# Patient Record
Sex: MALE | Race: OTHER | HISPANIC OR LATINO
[De-identification: names, ages, dates, MRNs, and addresses within clinical notes are randomized per-mention and may not be internally consistent; named-entity substitution may affect disease eponyms.]

---

## 2017-11-08 ENCOUNTER — APPOINTMENT (OUTPATIENT)
Dept: RHEUMATOLOGY | Facility: CLINIC | Age: 47
End: 2017-11-08

## 2018-08-09 ENCOUNTER — APPOINTMENT (OUTPATIENT)
Dept: NEUROSURGERY | Facility: CLINIC | Age: 48
End: 2018-08-09
Payer: COMMERCIAL

## 2018-08-09 VITALS
HEART RATE: 75 BPM | DIASTOLIC BLOOD PRESSURE: 85 MMHG | BODY MASS INDEX: 34.25 KG/M2 | HEIGHT: 68 IN | SYSTOLIC BLOOD PRESSURE: 147 MMHG | WEIGHT: 226 LBS

## 2018-08-09 DIAGNOSIS — F12.90 CANNABIS USE, UNSPECIFIED, UNCOMPLICATED: ICD-10-CM

## 2018-08-09 DIAGNOSIS — Z78.9 OTHER SPECIFIED HEALTH STATUS: ICD-10-CM

## 2018-08-09 DIAGNOSIS — M25.561 PAIN IN RIGHT KNEE: ICD-10-CM

## 2018-08-09 PROCEDURE — 99203 OFFICE O/P NEW LOW 30 MIN: CPT

## 2018-08-09 RX ORDER — METHYLPREDNISOLONE 4 MG/1
4 TABLET ORAL EVERY 6 HOURS
Qty: 1 | Refills: 0 | Status: ACTIVE | COMMUNITY
Start: 2018-08-09 | End: 1900-01-01

## 2018-08-10 PROBLEM — Z78.9 SOCIAL ALCOHOL USE: Status: ACTIVE | Noted: 2018-08-10

## 2018-08-10 PROBLEM — F12.90 USE OF CANNABIS: Status: ACTIVE | Noted: 2018-08-10

## 2018-08-10 RX ORDER — NAPROXEN 250 MG/1
250 TABLET ORAL
Refills: 0 | Status: ACTIVE | COMMUNITY

## 2018-08-10 RX ORDER — NAPROXEN SODIUM 550 MG/1
550 TABLET ORAL
Refills: 0 | Status: ACTIVE | COMMUNITY

## 2018-08-10 RX ORDER — GLUCOSAMINE/MSM/CHONDROIT SULF 500-166.6
1500 TABLET ORAL
Refills: 0 | Status: ACTIVE | COMMUNITY

## 2018-08-28 ENCOUNTER — APPOINTMENT (OUTPATIENT)
Dept: MRI IMAGING | Facility: CLINIC | Age: 48
End: 2018-08-28
Payer: COMMERCIAL

## 2018-08-28 ENCOUNTER — OUTPATIENT (OUTPATIENT)
Dept: OUTPATIENT SERVICES | Facility: HOSPITAL | Age: 48
LOS: 1 days | End: 2018-08-28
Payer: COMMERCIAL

## 2018-08-28 DIAGNOSIS — M54.10 RADICULOPATHY, SITE UNSPECIFIED: ICD-10-CM

## 2018-08-28 DIAGNOSIS — M54.2 CERVICALGIA: ICD-10-CM

## 2018-08-28 PROCEDURE — 72148 MRI LUMBAR SPINE W/O DYE: CPT

## 2018-08-28 PROCEDURE — 72148 MRI LUMBAR SPINE W/O DYE: CPT | Mod: 26

## 2018-08-28 PROCEDURE — 72141 MRI NECK SPINE W/O DYE: CPT | Mod: 26

## 2018-08-28 PROCEDURE — 72141 MRI NECK SPINE W/O DYE: CPT

## 2018-09-24 ENCOUNTER — APPOINTMENT (OUTPATIENT)
Dept: NEUROSURGERY | Facility: CLINIC | Age: 48
End: 2018-09-24
Payer: COMMERCIAL

## 2018-09-24 VITALS
HEIGHT: 68 IN | HEART RATE: 72 BPM | SYSTOLIC BLOOD PRESSURE: 138 MMHG | DIASTOLIC BLOOD PRESSURE: 90 MMHG | BODY MASS INDEX: 34.25 KG/M2 | WEIGHT: 226 LBS

## 2018-09-24 DIAGNOSIS — M54.10 RADICULOPATHY, SITE UNSPECIFIED: ICD-10-CM

## 2018-09-24 DIAGNOSIS — M54.2 CERVICALGIA: ICD-10-CM

## 2018-09-24 DIAGNOSIS — G89.29 CERVICALGIA: ICD-10-CM

## 2018-09-24 PROCEDURE — 99213 OFFICE O/P EST LOW 20 MIN: CPT

## 2018-09-24 RX ORDER — CYCLOBENZAPRINE HYDROCHLORIDE 10 MG/1
10 TABLET, FILM COATED ORAL 3 TIMES DAILY
Qty: 90 | Refills: 0 | Status: ACTIVE | COMMUNITY
Start: 2018-09-24 | End: 1900-01-01

## 2018-09-25 PROBLEM — M54.2 NECK PAIN, CHRONIC: Status: ACTIVE | Noted: 2018-08-09

## 2018-09-25 PROBLEM — M54.10 BACK PAIN WITH RIGHT-SIDED RADICULOPATHY: Status: ACTIVE | Noted: 2018-08-09

## 2023-01-30 ENCOUNTER — OFFICE (OUTPATIENT)
Dept: URBAN - METROPOLITAN AREA CLINIC 115 | Facility: CLINIC | Age: 53
Setting detail: OPHTHALMOLOGY
End: 2023-01-30
Payer: COMMERCIAL

## 2023-01-30 DIAGNOSIS — H25.13: ICD-10-CM

## 2023-01-30 DIAGNOSIS — E11.9: ICD-10-CM

## 2023-01-30 DIAGNOSIS — H01.014: ICD-10-CM

## 2023-01-30 DIAGNOSIS — H01.011: ICD-10-CM

## 2023-01-30 DIAGNOSIS — H52.4: ICD-10-CM

## 2023-01-30 PROCEDURE — 92015 DETERMINE REFRACTIVE STATE: CPT | Performed by: OPHTHALMOLOGY

## 2023-01-30 PROCEDURE — 92014 COMPRE OPH EXAM EST PT 1/>: CPT | Performed by: OPHTHALMOLOGY

## 2023-01-30 ASSESSMENT — REFRACTION_MANIFEST
OU_VA: 20/20
OS_SPHERE: +1.75
OD_ADD: +2.00
OS_SPHERE: +1.50
OD_AXIS: 030
OS_CYLINDER: -0.50
OD_SPHERE: +1.50
OS_CYLINDER: -0.50
OS_ADD: +2.00
OS_AXIS: 140
OS_ADD: +1.50
OD_AXIS: 15
OD_ADD: +1.50
OS_AXIS: 165
OD_VA1: 20/25+2
OD_VA1: 20/20
OS_VA1: 20/20-2
OD_SPHERE: +1.50
OS_VA1: 20/20
OD_CYLINDER: -0.50
OD_CYLINDER: -0.50

## 2023-01-30 ASSESSMENT — KERATOMETRY
OD_AXISANGLE_DEGREES: 087
METHOD_AUTO_MANUAL: AUTO
OD_K2POWER_DIOPTERS: 41.25
OD_K1POWER_DIOPTERS: 40.25
OS_AXISANGLE_DEGREES: 075
OS_K2POWER_DIOPTERS: 41.50
OS_K1POWER_DIOPTERS: 40.50

## 2023-01-30 ASSESSMENT — TONOMETRY: OS_IOP_MMHG: 21

## 2023-01-30 ASSESSMENT — AXIALLENGTH_DERIVED
OS_AL: 23.9306
OD_AL: 24.0254
OD_AL: 24.1269
OS_AL: 23.8308
OD_AL: 24.1269
OS_AL: 24.0312

## 2023-01-30 ASSESSMENT — REFRACTION_AUTOREFRACTION
OS_SPHERE: +2.00
OD_CYLINDER: -0.50
OD_AXIS: 033
OS_AXIS: 144
OS_CYLINDER: -0.50
OD_SPHERE: +1.75

## 2023-01-30 ASSESSMENT — VISUAL ACUITY
OD_BCVA: 20/70-2
OS_BCVA: 20/40

## 2023-01-30 ASSESSMENT — SPHEQUIV_DERIVED
OS_SPHEQUIV: 1.75
OS_SPHEQUIV: 1.5
OD_SPHEQUIV: 1.25
OS_SPHEQUIV: 1.25
OD_SPHEQUIV: 1.25
OD_SPHEQUIV: 1.5

## 2023-01-30 ASSESSMENT — LID EXAM ASSESSMENTS
OS_BLEPHARITIS: LUL 1+
OD_BLEPHARITIS: RUL 1+

## 2023-01-30 ASSESSMENT — CONFRONTATIONAL VISUAL FIELD TEST (CVF)
OD_FINDINGS: FULL
OS_FINDINGS: FULL

## 2023-08-11 ENCOUNTER — EMERGENCY (EMERGENCY)
Facility: HOSPITAL | Age: 53
LOS: 1 days | Discharge: HOME | End: 2023-08-11
Attending: STUDENT IN AN ORGANIZED HEALTH CARE EDUCATION/TRAINING PROGRAM
Payer: COMMERCIAL

## 2023-08-11 VITALS
HEIGHT: 70 IN | TEMPERATURE: 98 F | RESPIRATION RATE: 18 BRPM | WEIGHT: 220.02 LBS | OXYGEN SATURATION: 95 % | HEART RATE: 93 BPM | SYSTOLIC BLOOD PRESSURE: 161 MMHG | DIASTOLIC BLOOD PRESSURE: 96 MMHG

## 2023-08-11 PROCEDURE — 71046 X-RAY EXAM CHEST 2 VIEWS: CPT

## 2023-08-11 PROCEDURE — 99283 EMERGENCY DEPT VISIT LOW MDM: CPT | Mod: 25

## 2023-08-11 PROCEDURE — 99284 EMERGENCY DEPT VISIT MOD MDM: CPT

## 2023-08-11 PROCEDURE — 71046 X-RAY EXAM CHEST 2 VIEWS: CPT | Mod: 26

## 2023-08-11 PROCEDURE — T1013: CPT

## 2023-08-11 RX ORDER — IBUPROFEN 200 MG
600 TABLET ORAL ONCE
Refills: 0 | Status: COMPLETED | OUTPATIENT
Start: 2023-08-11 | End: 2023-08-11

## 2023-08-11 RX ORDER — CYCLOBENZAPRINE HYDROCHLORIDE 10 MG/1
10 TABLET, FILM COATED ORAL ONCE
Refills: 0 | Status: COMPLETED | OUTPATIENT
Start: 2023-08-11 | End: 2023-08-11

## 2023-08-11 RX ADMIN — Medication 600 MILLIGRAM(S): at 21:56

## 2023-08-11 RX ADMIN — CYCLOBENZAPRINE HYDROCHLORIDE 10 MILLIGRAM(S): 10 TABLET, FILM COATED ORAL at 21:56
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
## 2023-08-11 NOTE — ED PROVIDER NOTE - PATIENT PORTAL LINK FT
You can access the FollowMyHealth Patient Portal offered by [facility] by registering at the following website: [email or web address]. By joining [employer]’s FollowMyHealth portal, you will also be able to view your health information using other applications (apps) compatible with our system.

## 2023-08-11 NOTE — ED PROVIDER NOTE - NS ED ROS FT
No fever/chills   No photophobia/eye pain/changes in visio,   No ear pain/sore throat/dysphagia   No chest pain/palpitations  No SOB/cough/wheeze/stridor   No abdominal pain, No N/V/D  No dysuria/frequency/discharge   + neck pain no back pain,   No rash  No changes in neurological status/function.

## 2023-08-11 NOTE — ED PROVIDER NOTE - PHYSICAL EXAMINATION
Constitutional - well-developed.   Head - NCAT. Airway patent.   Eyes - PERRL.   CV - RRR  pulm - CTAB  abd - soft, nt. no rebound. no guarding.  Neuro - A&Ox3. strength 5/5 x4. sensation intact x4. normal gait.   Skin - No rash. .  MSK - normal ROM. R paraspinal neck ttp. no midline ttp

## 2023-09-04 ENCOUNTER — EMERGENCY (EMERGENCY)
Facility: HOSPITAL | Age: 53
LOS: 1 days | Discharge: HOME | End: 2023-09-04
Attending: EMERGENCY MEDICINE
Payer: COMMERCIAL

## 2023-09-04 VITALS
TEMPERATURE: 98 F | OXYGEN SATURATION: 95 % | RESPIRATION RATE: 16 BRPM | HEART RATE: 103 BPM | HEIGHT: 70 IN | DIASTOLIC BLOOD PRESSURE: 94 MMHG | SYSTOLIC BLOOD PRESSURE: 174 MMHG | WEIGHT: 179.9 LBS

## 2023-09-04 PROBLEM — Z78.9 OTHER SPECIFIED HEALTH STATUS: Chronic | Status: ACTIVE | Noted: 2023-08-11

## 2023-09-04 PROCEDURE — 12002 RPR S/N/AX/GEN/TRNK2.6-7.5CM: CPT

## 2023-09-04 PROCEDURE — 90471 IMMUNIZATION ADMIN: CPT

## 2023-09-04 PROCEDURE — 90715 TDAP VACCINE 7 YRS/> IM: CPT

## 2023-09-04 PROCEDURE — 99284 EMERGENCY DEPT VISIT MOD MDM: CPT | Mod: 25

## 2023-09-04 PROCEDURE — 99283 EMERGENCY DEPT VISIT LOW MDM: CPT | Mod: 23

## 2023-09-04 RX ORDER — TETANUS TOXOID, REDUCED DIPHTHERIA TOXOID AND ACELLULAR PERTUSSIS VACCINE, ADSORBED 5; 2.5; 8; 8; 2.5 [IU]/.5ML; [IU]/.5ML; UG/.5ML; UG/.5ML; UG/.5ML
0.5 SUSPENSION INTRAMUSCULAR ONCE
Refills: 0 | Status: COMPLETED | OUTPATIENT
Start: 2023-09-04 | End: 2023-09-04

## 2023-09-04 RX ORDER — CEPHALEXIN 500 MG
1 CAPSULE ORAL
Qty: 40 | Refills: 0
Start: 2023-09-04 | End: 2023-09-13

## 2023-09-04 RX ADMIN — TETANUS TOXOID, REDUCED DIPHTHERIA TOXOID AND ACELLULAR PERTUSSIS VACCINE, ADSORBED 0.5 MILLILITER(S): 5; 2.5; 8; 8; 2.5 SUSPENSION INTRAMUSCULAR at 12:11

## 2023-09-04 NOTE — ED PROVIDER NOTE - MDM ORDERS SUBMITTED SELECTION
He cannot remain on opiates long term and cannot have 2 different opiate prescriptions at the same time, only one or the other.   Not Applicable
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
## 2023-09-04 NOTE — ED PROVIDER NOTE - OBJECTIVE STATEMENT
52 y/o M c/o laceration on right anterior shin x 2 hours.   Not up to date on tetanus.  Patient accidentally cut it on a piece of aluminum when he was stepping in the garbage.

## 2023-09-04 NOTE — ED PROVIDER NOTE - NS ED ATTENDING STATEMENT MOD
This was a shared visit with the SELAM. I reviewed and verified the documentation and independently performed the documented:

## 2023-09-04 NOTE — ED PROVIDER NOTE - PATIENT PORTAL LINK FT
You can access the FollowMyHealth Patient Portal offered by [facility] by registering at the following website: [email or web address]. By joining [employer]’s FollowMyHealth portal, you will also be able to view your health information using other applications (apps) compatible with our system.

## 2023-12-27 PROBLEM — Z00.00 ENCOUNTER FOR PREVENTIVE HEALTH EXAMINATION: Status: ACTIVE | Noted: 2023-12-27

## 2023-12-28 ENCOUNTER — APPOINTMENT (OUTPATIENT)
Dept: UROLOGY | Facility: CLINIC | Age: 53
End: 2023-12-28
Payer: COMMERCIAL

## 2023-12-28 VITALS
BODY MASS INDEX: 31.5 KG/M2 | SYSTOLIC BLOOD PRESSURE: 125 MMHG | HEART RATE: 96 BPM | HEIGHT: 70 IN | WEIGHT: 220 LBS | DIASTOLIC BLOOD PRESSURE: 90 MMHG

## 2023-12-28 DIAGNOSIS — Z78.9 OTHER SPECIFIED HEALTH STATUS: ICD-10-CM

## 2023-12-28 DIAGNOSIS — Z83.3 FAMILY HISTORY OF DIABETES MELLITUS: ICD-10-CM

## 2023-12-28 DIAGNOSIS — N28.89 OTHER SPECIFIED DISORDERS OF KIDNEY AND URETER: ICD-10-CM

## 2023-12-28 DIAGNOSIS — F17.200 NICOTINE DEPENDENCE, UNSPECIFIED, UNCOMPLICATED: ICD-10-CM

## 2023-12-28 PROCEDURE — 99203 OFFICE O/P NEW LOW 30 MIN: CPT

## 2023-12-28 RX ORDER — TADALAFIL 5 MG/1
5 TABLET ORAL
Qty: 30 | Refills: 0 | Status: ACTIVE | COMMUNITY
Start: 2023-12-28

## 2023-12-28 RX ORDER — ROSUVASTATIN CALCIUM 20 MG/1
20 TABLET, FILM COATED ORAL DAILY
Qty: 30 | Refills: 0 | Status: ACTIVE | COMMUNITY
Start: 2023-12-28

## 2023-12-28 RX ORDER — GLIMEPIRIDE 4 MG/1
4 TABLET ORAL DAILY
Qty: 30 | Refills: 0 | Status: ACTIVE | COMMUNITY
Start: 2023-12-28

## 2023-12-28 NOTE — LETTER BODY
[Dear  ___] : Dear  [unfilled], [Courtesy Letter:] : I had the pleasure of seeing your patient, [unfilled], in my office today. [Please see my note below.] : Please see my note below. [Sincerely,] : Sincerely, [FreeTextEntry3] : Ed  Cisco Boyer MD [facility]  of [facility] [place] and [employer] at [facility]
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
## 2023-12-28 NOTE — ASSESSMENT
[FreeTextEntry1] : Impression:  images and report of MRI reviewed.   Right upper pole solid renal mass.   Plan:  refer to Dr. Xiong for consideration of partial nephrectomy.

## 2023-12-28 NOTE — PHYSICAL EXAM
[Normal Appearance] : normal appearance [Well Groomed] : well groomed [General Appearance - In No Acute Distress] : no acute distress [Edema] : no peripheral edema [Respiration, Rhythm And Depth] : normal respiratory rhythm and effort [Exaggerated Use Of Accessory Muscles For Inspiration] : no accessory muscle use [Abdomen Tenderness] : non-tender [Normal Station and Gait] : the gait and station were normal for the patient's age [] : no rash [No Focal Deficits] : no focal deficits [Oriented To Time, Place, And Person] : oriented to person, place, and time [Affect] : the affect was normal [Mood] : the mood was normal [No Palpable Adenopathy] : no palpable adenopathy

## 2024-02-12 ENCOUNTER — APPOINTMENT (OUTPATIENT)
Dept: UROLOGY | Facility: CLINIC | Age: 54
End: 2024-02-12

## 2024-04-08 ENCOUNTER — APPOINTMENT (OUTPATIENT)
Dept: UROLOGY | Facility: CLINIC | Age: 54
End: 2024-04-08

## 2024-08-17 ENCOUNTER — RX ONLY (RX ONLY)
Age: 54
End: 2024-08-17

## 2024-08-17 ENCOUNTER — OFFICE (OUTPATIENT)
Dept: URBAN - METROPOLITAN AREA CLINIC 12 | Facility: CLINIC | Age: 54
Setting detail: OPHTHALMOLOGY
End: 2024-08-17
Payer: COMMERCIAL

## 2024-08-17 DIAGNOSIS — H01.011: ICD-10-CM

## 2024-08-17 DIAGNOSIS — H16.223: ICD-10-CM

## 2024-08-17 DIAGNOSIS — H25.13: ICD-10-CM

## 2024-08-17 DIAGNOSIS — H01.014: ICD-10-CM

## 2024-08-17 DIAGNOSIS — H52.4: ICD-10-CM

## 2024-08-17 PROCEDURE — 92014 COMPRE OPH EXAM EST PT 1/>: CPT | Performed by: OPTOMETRIST

## 2024-08-17 PROCEDURE — 92015 DETERMINE REFRACTIVE STATE: CPT | Performed by: OPTOMETRIST

## 2024-08-17 PROCEDURE — 92250 FUNDUS PHOTOGRAPHY W/I&R: CPT | Performed by: OPTOMETRIST

## 2024-08-17 ASSESSMENT — CONFRONTATIONAL VISUAL FIELD TEST (CVF)
OD_FINDINGS: FULL
OS_FINDINGS: FULL

## 2024-08-17 ASSESSMENT — LID EXAM ASSESSMENTS
OD_BLEPHARITIS: RUL 1+
OS_BLEPHARITIS: LUL 1+

## 2024-08-27 ENCOUNTER — APPOINTMENT (OUTPATIENT)
Dept: GASTROENTEROLOGY | Facility: CLINIC | Age: 54
End: 2024-08-27
Payer: COMMERCIAL

## 2024-08-27 VITALS
RESPIRATION RATE: 16 BRPM | WEIGHT: 216 LBS | HEIGHT: 70 IN | HEART RATE: 90 BPM | BODY MASS INDEX: 30.92 KG/M2 | OXYGEN SATURATION: 98 % | DIASTOLIC BLOOD PRESSURE: 80 MMHG | SYSTOLIC BLOOD PRESSURE: 125 MMHG

## 2024-08-27 DIAGNOSIS — Z12.11 ENCOUNTER FOR SCREENING FOR MALIGNANT NEOPLASM OF COLON: ICD-10-CM

## 2024-08-27 DIAGNOSIS — Z86.39 PERSONAL HISTORY OF OTHER ENDOCRINE, NUTRITIONAL AND METABOLIC DISEASE: ICD-10-CM

## 2024-08-27 DIAGNOSIS — N28.89 OTHER SPECIFIED DISORDERS OF KIDNEY AND URETER: ICD-10-CM

## 2024-08-27 DIAGNOSIS — R11.0 NAUSEA: ICD-10-CM

## 2024-08-27 DIAGNOSIS — M54.2 CERVICALGIA: ICD-10-CM

## 2024-08-27 DIAGNOSIS — M25.561 PAIN IN RIGHT KNEE: ICD-10-CM

## 2024-08-27 DIAGNOSIS — M54.10 RADICULOPATHY, SITE UNSPECIFIED: ICD-10-CM

## 2024-08-27 DIAGNOSIS — R14.2 ERUCTATION: ICD-10-CM

## 2024-08-27 DIAGNOSIS — K21.9 GASTRO-ESOPHAGEAL REFLUX DISEASE W/OUT ESOPHAGITIS: ICD-10-CM

## 2024-08-27 DIAGNOSIS — G89.29 CERVICALGIA: ICD-10-CM

## 2024-08-27 PROCEDURE — 99204 OFFICE O/P NEW MOD 45 MIN: CPT

## 2024-08-27 RX ORDER — ESOMEPRAZOLE MAGNESIUM 20 MG/1
20 CAPSULE, DELAYED RELEASE ORAL
Refills: 0 | Status: ACTIVE | COMMUNITY

## 2024-08-27 RX ORDER — GABAPENTIN 300 MG/1
300 CAPSULE ORAL
Refills: 0 | Status: ACTIVE | COMMUNITY

## 2024-08-27 RX ORDER — POLYETHYLENE GLYCOL-3350, SODIUM CHLORIDE, POTASSIUM CHLORIDE AND SODIUM BICARBONATE 420; 11.2; 5.72; 1.48 G/438.4G; G/438.4G; G/438.4G; G/438.4G
420 POWDER, FOR SOLUTION ORAL
Qty: 1 | Refills: 0 | Status: ACTIVE | COMMUNITY
Start: 2024-08-27 | End: 1900-01-01

## 2024-08-27 NOTE — HISTORY OF PRESENT ILLNESS
[FreeTextEntry1] : The patient's son was present with him and acted as .  The patient has a history of chronic dyspeptic symptoms for many years for which she apparently underwent an upper endoscopy many years ago the results of which she does not recall.  He was apparently treated with an antiacid and the symptom significantly diminished only to recur intermittently.  At this time he notes rather frequent bouts of heartburn associated with belching and nausea.  He has been taking over-the-counter Nexium 20 mg as needed.  He denies any dysphagia.  His appetite and weight are stable.  He has never undergone a colonoscopy.  He denies any diarrhea or constipation.  There is no rectal bleeding.  In December of last year he was seen by a urologist, Dr. Boyer, for evaluation of a 2.4 cm solid right upper pole renal mass.  He was referred to Dr. Murrieta for possible resection but the patient decided not to follow through as he was afraid and has not had any further evaluation since that time.

## 2024-08-27 NOTE — ASSESSMENT
[FreeTextEntry1] : The patient essentially has chronic dyspeptic symptoms with a significant component of what sounds like gastroesophageal reflux.  He will therefore be scheduled for an upper endoscopy for further evaluation.  In the meantime I have recommended that he take the over-the-counter Nexium every morning.  He should avoid chocolate, peppermint and fatty foods.  Weight loss was also recommended.  He should not eat or drink within 3 hours of bedtime.  He has never undergone a colonoscopy and a screening colonoscopy will be scheduled as well.  He will obtain a CBC, basic metabolic profile, prothrombin time and celiac antibodies.  I have also cautioned him about the possibility of an underlying small kidney cancer and strongly advised him to follow-up with the urologist. The risks, benefits, complications and possible adverse consequences associated with upper endoscopy were discussed with the patient. The risks, benefits, complications and possible adverse consequences associated with colonoscopy were discussed with the patient.

## 2024-08-27 NOTE — PHYSICAL EXAM
[Alert] : alert [Normal Voice/Communication] : normal voice/communication [Healthy Appearing] : healthy appearing [No Acute Distress] : no acute distress [Sclera] : the sclera and conjunctiva were normal [Hearing Threshold Finger Rub Not Porter] : hearing was normal [Normal Lips/Gums] : the lips and gums were normal [Oropharynx] : the oropharynx was normal [Normal Appearance] : the appearance of the neck was normal [No Respiratory Distress] : no respiratory distress [No Acc Muscle Use] : no accessory muscle use [Respiration, Rhythm And Depth] : normal respiratory rhythm and effort [Auscultation Breath Sounds / Voice Sounds] : lungs were clear to auscultation bilaterally [Heart Rate And Rhythm] : heart rate was normal and rhythm regular [Normal S1, S2] : normal S1 and S2 [Murmurs] : no murmurs [Bowel Sounds] : normal bowel sounds [Abdomen Tenderness] : non-tender [No Masses] : no abdominal mass palpated [Abdomen Soft] : soft [] : no hepatosplenomegaly [Oriented To Time, Place, And Person] : oriented to person, place, and time [Obese (BMI >= 30)] : obese (BMI >= 30)

## 2024-10-14 ENCOUNTER — APPOINTMENT (OUTPATIENT)
Dept: UROLOGY | Facility: CLINIC | Age: 54
End: 2024-10-14

## 2024-10-22 ENCOUNTER — APPOINTMENT (OUTPATIENT)
Dept: GASTROENTEROLOGY | Facility: GI CENTER | Age: 54
End: 2024-10-22

## 2025-03-10 ENCOUNTER — APPOINTMENT (OUTPATIENT)
Dept: UROLOGY | Facility: CLINIC | Age: 55
End: 2025-03-10

## 2025-07-15 NOTE — ED ADULT TRIAGE NOTE - GLASGOW COMA SCALE: EYE OPENING, MLM
LOR PAIN MANAGEMENT FOLLOW UP VISIT      PCP: Edwin Garner MD    Chief Complaint   Patient presents with   • Office Visit     Pain is located: low back right side  Describes as: pressure, ache  Averages: 5  Worst is: 10  Acceptable is: 5  Aggravated By: walking, standing, stairs, bending, twisting  Alleviated By: pcp rx's hydrocodone - helps    Meds RX'd          INTERVAL:  Donavon is a 66 year old male who presents for interventional follow up of chronic bilateral buttock pain, bilateral knee pain and bilateral foot pain. Since patient's last visit, he has had a right sacroiliac joint injection performed on 9/11/24 that provided 86% diagnostic relief and 0% therapeutic benefit. The worst pain is right sided buttock pain and described as aching and pressing in nature, with severity of 5/10 VNAS on average, 10/10 VNAS at its worst and 5/10 VNAS as tolerable.  The pain is aggravated by walking, standing, stairs, bending and twisting and alleviated by medications. He denies gait disturbance, falls, urinary incontinence/retention, bowel incontinence/retention, and saddle anesthesia.     Patient is currently being prescribed cymbalta with moderate benefit and no associated side effects. From other providers they are receiving  Vicodin and Amitriptyline     Mood: stable.     SOCIAL HISTORY:  - Alcohol Abuse: No  - Substance Abuse: No    Lumbar/Hip/Knee ADL's:  Walking Functionality:     5  Standing Functionality:     5  Walking Up Stairs Functionality:    9    How long are you able to walk without pain:                0 minutes  How long are you able to stand for without pain:             0 minutes    ORT:  0, on 7/15/2025  Previous:  0, on 8/22/24    Oswestry Disability:  Unable to fill out, on 7/15/2025  Previous:  36%, on 8/22/24    HPI INITIAL (Portions of this note are brought forward from Sean Dye MD initial note on 8/16/22; reviewed and edited as appropriate):  Donavon Whitehead is a 63 year old male  with chronic bilateral knee pain pain as described below.  Referred by Zeke Hatch DO for consultation and management.     Pain Score (0-10):  Current 6/10; Worst 7/10;  Least 6/10;  Average 6/10;  Acceptable 5/10  Duration:  10 years  Context of pain:  Pain started insidiously without any events or trauma he can recall  Location:  Bilateral knees (medial aspect), low back and buttock  Radiation: calf  Quality: sharp and tingling  Improves:  Resting, forward flexion  Exacerbates:?  Standing, walking, weather changes       Numbness/Tingling:  Bilateral feet  Weakness:  none  Sleep:  6  hrs  Mood:  content   ?  Bowel and bladder - Denies new onset incontinence or saddle anesthesia.     SOCIAL HISTORY:  - Alcohol Abuse: No  - Substance Abuse: No     INTERVENTIONS:  Injections:    Bilateral knee steroid injections - 4 months of relief with 50% relief  08/31/2022 Genicular Nerve Block, Bilateral with Dr. Dye 70% pain relief for one week   10/13/2022 Bilateral knee intra-articular gel injection with Dr. Dye 100% pain relief   10/26/2022 Sacroiliac Injection, Bilateral with Dr. Dye 0% pain relief   03/23/2023 Sacroiliac Injection, Right with Dr. Dye 100% pain relief   04/26/2023 Interlaminar Epidural Steroid Injection, Lumbar, L4-L5 with Dr. Dye 80% pain relief   07/18/2023 Bilateral glenohumeral injection ultrasound guided with BRONSON Curtis 100% pain relief on going   12/21/23  Interlaminar Epidural Steroid Injection, Lumbar, L4-L5 67% pain relief 8 weeks   12/27/23  Medial Branch/Dorsal Ramus Block, Bilateral, Lumbar L3, L4, and L5, First Diagnostic Block  100% pain relief   1/16/24 Medial Branch/Dorsal Ramus Block, Bilateral, Lumbar L3, L4, and L5, Second Diagnostic Block  88% pain relief   1/31/24 Radiofrequency Ablation Medial Branch/Dorsal Ramus, Left, Lumbar L3, L4,  L5  100% pain relief ongoing  2/14/24 Radiofrequency Ablation Medial Branch/Dorsal Ramus, Right, Lumbar L3, L4, L5 40% pain relief  ongoing  03/29/2024 Synvisc One Left knee intra-articular gel injection. 100 % relief   9/11/2024 - Sacroiliac Joint Injection, Right (0% relief)           Physical Therapy: Not in a structured PT program.  Surgeries (Spine, joint, related to pain):   None   Medications (pain/mood/depression): (with doses, duration of use, side effects, etc...)  Current  Norco 5-325 mg 1-2 tab daily PRN   Elavil 50 mg at bedtime   Previous  Gabapentin   Cymbalta 60mg daily     If on contract (update)  Urine tox screen: None  Prescription Drug Monitoring Program verified this visit.  Opioid contract: No                Recent PHQ 2/9 Score    PHQ 2:  PHQ 2 Score Adult PHQ 2 Score Adult PHQ 2 Interpretation Little interest or pleasure in activity?   3/20/2025   1:42 PM 0 No further screening needed 0       PHQ 9:        PHYSICAL EXAMINATION:  Constitutional: NAD  Psychiatric: Alert, awake, and oriented x3     Low Back Exam:  Visual Exam: No gross atrophy appreciated bilaterally, no rash appreciated, no incisions present  Alignment: Normal lordosis  Range of Motion: Limited ROM without pain noted: flexion and extension.  Tenderness Noted: None.  Facet Loading: Negative loading lower lumbar facets bilateral.  Positive Special Testing: None.  Gait: Antalgic  Pain is not relieved with sitting down.   Hip:  Asymmetry Noted: None   Range of Motion: Full ROM with pain noted: right internal rotation and right external rotation.  Tenderness Noted: right sacroiliac joint  Special Testing: Right Emil's Test (ADOLFO). Right Gaenslen's. Right Thigh Thrust.  Neurologic Exam  Sensory Exam: Grossly intact throughout bilateral lower extremity L1-S1.     Motor:  Lower Extremities    R/L  (L1/L2) hip flexion    5/5  (L3/L4) knee extension   5/5  (L5) great toe dorsiflexion   5/5  (S1) foot plantarflexion   5/5    Reflex:  Lower extremities   R/L  (L4) patella  +2/2+  (S1) achilles  +2/2+    IMAGING STUDIES:  EXAM:  XR LUMBAR SPINE 2 OR 3 VIEWS     EXAM  DATE:  9/17/2021 11:41 AM.     INDICATION: Unspecified inflammatory spondylopathy, lumbar region  (CMS/HCC).     COMPARISON: None.     FINDINGS: Study reveals normal bony alignment of the lumbar spine. There  are five non-rib-bearing lumbar-type vertebrae with maintenance of  vertebral body heights. Degenerative changes of the lumbar spine are  demonstrated with scattered multilevel marginal osteophytosis and Schmorl's  node formation. Facet arthropathy is noted at the L5 and L4 levels  subluxation. No osseous destruction.        IMPRESSION: Degenerative changes of the lumbar spine are demonstrated as  above .     3 VIEWS OF THE RIGHT KNEE      INDICATION: Pain.     COMPARISON: 10/29/2018.     FINDINGS:     No fractures or dislocations.  Mild to moderate narrowing of both medial and lateral compartments.  Marginal osteophyte formation. Mild narrowing at the patellofemoral joint.  Slight lateral subluxation of the patella. 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  Mild suprapatellar joint effusion.     IMPRESSION:     1. Findings consistent with hypertrophic osteoarthritis. Mild effusion     Narrative & Impression   MRI LUMBAR SPINE WO CONTRAST     CLINICAL HISTORY: Spinal stenosis, lumbosacral,      COMPARISON: No previous comparison imaging is available.     TECHNIQUE:  MRI LUMBAR SPINE WO CONTRAST     FINDINGS:     Alignment: Normal.     Vertebral bodies: No vertebral body height loss or fracture.     Intervertebral discs: Mild disc bulging L3-4 L4-5 with extension into the  caudal aspects the neural foramina. No focal disc protrusion.     Lumbar facets: Mild hypertrophic facet changes with ligamentum flavum  thickening throughout the lumbar spine, severe at L5-S1.      There is asymmetric right L5-S1 facet hypertrophy versus right synovial  cyst resulting in severe narrowing of the right lateral recess and mass  effect upon the right S1 nerve root sleeve within the canal. Reference  series 5 image 29.     Bone  marrow: Normal.     Conus medullaris: Normal configuration, terminates at L1-2.     L1-L2:  Canal and foramina are patent.  L2-L3:  Canal and foramina are patent.  L3-L4:  Canal is patent. Disc bulging extends into the caudal aspects of  the neural foramina bilaterally without nerve root compression.  L4-L5:  Canal is patent. Disc bulging extends into the caudal aspects of  the neural foramina bilaterally. Moderate bilateral foraminal narrowing.  L5-S1: Canal is patent. Moderate right foraminal stenosis and  moderate/severe right lateral recess stenosis. Possible compromise of the  right L5 and S1 nerve root sleeves.     Miscellaneous: No acute findings.        IMPRESSION:  1.  Severe facet hypertrophy L5-S1 contributing to moderate right foraminal  stenosis and moderate/severe right lateral recess stenosis at L5-S1 with  possible compression of the right L5 and/or S1 nerve root sleeves.  2.  Moderate bilateral foraminal stenosis L4-5.   3.  No severe central canal stenosis.        ASSESSMENT AND PLAN:  Donavon Whitehead is a 66 year old male with history of chronic low back, bilateral buttock, bilateral knee, bilateral shoulder and bilateral foot pain. Regarding his bilateral knee pain it has failed to improve with intra-articular steroid injections. He additionally underwent bilateral genicular nerve block with ~70% improvement, unfortunately, genicular RFA is not a covered benefit. he thus underwent a left intraarticular knee gel injection which provided <2 months of 100% relief. He reports this pain is well controlled at this time. For the shoulder pain, he underwent glenohumeral joint injections with 100% sustained benefit.       Regarding his low back pain with radiation to his bilateral buttocks and thighs he underwent a L4-L5 ILESI performed on 4/26/23 that provided 80% relief x 8 months. Given his prolonged relief following this injection we elected to repeat which provided 8 weeks of 67% relief. He since  (E4) spontaneous underwent lumbar medial branch radiofrequency ablations with 100% relief on the left and 40% relief on the right. His previous lumbar MRI demonstrated moderate to severe right sided lateral recess stenosis at L5-S1. Updated MRI demonstrates multilevel facet arthrosis, severe at L5-S1. He has previously discussed possible advanced procedures (MILD/vertiflex pending updated MRI review), SCS trial and neurosurgical referral. Of note, he additionally suffers from bilateral foot pain from diabetic peripheral neuropathy which additionally may benefit from SCS. At his last office visit, patient had multiple positive provocative exam findings for right SI joint and he underwent a right SI joint injection with 86% diagnostic relief and 0% therapeutic benefit. At this juncture, we discussed multiple options for management of his residual SI joint pain including sacral lateral branch block/RFA, cluneal nerve SPRINT and series of diagnostic SI joint injections for fusion candidacy (intraX). At this time he wishes to proceed with sacral lateral branch blocks. He is agreeable to our plan of care as detailed below and denies further questions or concerns at this time.     Co morbid issues including HTN, SSS with pacemaker implantation, DM II, obesity and LICHA    All pertinent diagnostic imaging was reviewed and discussed with the patient during this visit.    Based on today's evaluation we discussed the risks and benefits of medication management and interventional treatment.  Our multimodal treatment options are outlined below.    A longitudinal relationship has been established with patient due to inherent complex nature of pain management. Visits take into account the holistic nature of pain care including but not limited to medications, pain interventions, advanced procedures and physical therapy/home exercise.       DIAGNOSIS:   1. Sacroiliac dysfunction    2. Spinal stenosis of lumbar region with neurogenic claudication    3.  Diabetic peripheral neuropathy  (CMD)          PLAN:  Medications:   Discussed possible Medication side effects, risks and benefits.  Taking Norco 5 mg 1-2 tab daily PRN   Taking Elavil 50 mg at bedtime  Patient was counseled on potential risks and side effects of medications.   Diagnostics:   None indicated at this time.  Interventions:   Schedule Sacral Lateral Branch Block Right S1, S2 and S3 x2 in procedure room. If helpful, proceed with radiofrequency ablation. (If not covered by insurance submit for right cluneal nerve SPRINT in the procedure room)  Advanced/Devices:   None indicated at this time.  Consults: None indicated at this time.  Physical Therapy: Recommend ongoing activity as tolerated.   Follow-up as needed for office follow up with ISRA Ceballos or sooner if problems arise.     MC CeballosC  Interventional Pain Medicine  [facility]      The risks, consequences, alternatives, and benefits of various treatment options were discussed with the patient in great detail including conservative management, injections and procedures.